# Patient Record
Sex: FEMALE | Race: WHITE | NOT HISPANIC OR LATINO
[De-identification: names, ages, dates, MRNs, and addresses within clinical notes are randomized per-mention and may not be internally consistent; named-entity substitution may affect disease eponyms.]

---

## 2023-02-20 ENCOUNTER — RX ONLY (OUTPATIENT)
Age: 58
Setting detail: RX ONLY
End: 2023-02-20

## 2023-02-20 ENCOUNTER — APPOINTMENT (RX ONLY)
Dept: URBAN - METROPOLITAN AREA CLINIC 146 | Facility: CLINIC | Age: 58
Setting detail: DERMATOLOGY
End: 2023-02-20

## 2023-02-20 DIAGNOSIS — Z41.9 ENCOUNTER FOR PROCEDURE FOR PURPOSES OTHER THAN REMEDYING HEALTH STATE, UNSPECIFIED: ICD-10-CM

## 2023-02-20 PROCEDURE — ? BOTOX

## 2023-02-20 RX ORDER — BIMATOPROST 0.3 MG/ML
SOLUTION/ DROPS OPHTHALMIC
Qty: 1 | Refills: 6 | COMMUNITY
Start: 2023-02-20

## 2023-02-20 ASSESSMENT — LOCATION ZONE DERM: LOCATION ZONE: FACE

## 2023-02-20 ASSESSMENT — LOCATION DETAILED DESCRIPTION DERM: LOCATION DETAILED: LEFT INFERIOR MEDIAL MALAR CHEEK

## 2023-02-20 ASSESSMENT — LOCATION SIMPLE DESCRIPTION DERM: LOCATION SIMPLE: LEFT CHEEK

## 2023-02-20 NOTE — PROCEDURE: BOTOX
Total Units: [address] 44
Map Statement: Please see attached map for locations and injection amounts. \\nGl 8,6,7\\nFh 9,9\\nCF 12,12
Consent: Written consent obtained. Risks include but not limited to lid/brow ptosis, bruising, swelling, diplopia, temporary effect, incomplete chemical denervation.
Show Inventory Tab: Show
Post-Care Instructions: Patient instructed to not lie down for 4 hours and limit physical activity for 24 hours.
Detail Level: Zone

## 2023-03-09 ENCOUNTER — APPOINTMENT (RX ONLY)
Dept: URBAN - METROPOLITAN AREA CLINIC 146 | Facility: CLINIC | Age: 58
Setting detail: DERMATOLOGY
End: 2023-03-09

## 2023-03-09 DIAGNOSIS — Z41.9 ENCOUNTER FOR PROCEDURE FOR PURPOSES OTHER THAN REMEDYING HEALTH STATE, UNSPECIFIED: ICD-10-CM

## 2023-03-09 PROCEDURE — ? MICRONEEDLING

## 2023-03-09 ASSESSMENT — LOCATION DETAILED DESCRIPTION DERM: LOCATION DETAILED: LEFT CENTRAL MALAR CHEEK

## 2023-03-09 ASSESSMENT — LOCATION ZONE DERM: LOCATION ZONE: FACE

## 2023-03-09 ASSESSMENT — LOCATION SIMPLE DESCRIPTION DERM: LOCATION SIMPLE: LEFT CHEEK

## 2023-03-09 NOTE — PROCEDURE: MICRONEEDLING
Depth In Mm: 0.1
Topical Anesthesia?: 10% benzocaine, 3% lidocaine, 2% tetracaine, 0.01% phenylephrine
Detail Level: Zone
Treatment Number (Optional): 0
Consent: Written consent obtained, risks reviewed including but not limited to pain, scarring, infection and incomplete improvement. Patient understands the procedure is cosmetic in nature and will require out of pocket payment.
Price (Use Numbers Only, No Special Characters Or $): [address]
Post-Care Instructions: After the procedure, take precautions agains sun exposure. Do not apply sunscreen for 12 hours after the procedure. Do not apply make-up for 12 hours after the procedure. Avoid alcohol based toners for 10-14 days. After 2-3 days patients can return to their regular skin regimen.
Infusions (Optional): hyaluronic acid

## 2023-05-12 ENCOUNTER — APPOINTMENT (RX ONLY)
Dept: URBAN - METROPOLITAN AREA CLINIC 146 | Facility: CLINIC | Age: 58
Setting detail: DERMATOLOGY
End: 2023-05-12

## 2023-05-12 DIAGNOSIS — Z41.9 ENCOUNTER FOR PROCEDURE FOR PURPOSES OTHER THAN REMEDYING HEALTH STATE, UNSPECIFIED: ICD-10-CM

## 2023-05-12 PROCEDURE — ? COSMETIC CONSULTATION: PROFOUND

## 2023-05-12 PROCEDURE — ? COSMETIC CONSULTATION: GENERAL

## 2023-05-15 ENCOUNTER — APPOINTMENT (RX ONLY)
Dept: URBAN - METROPOLITAN AREA CLINIC 146 | Facility: CLINIC | Age: 58
Setting detail: DERMATOLOGY
End: 2023-05-15

## 2023-05-15 DIAGNOSIS — Z41.9 ENCOUNTER FOR PROCEDURE FOR PURPOSES OTHER THAN REMEDYING HEALTH STATE, UNSPECIFIED: ICD-10-CM

## 2023-05-15 PROCEDURE — ? COSMETIC CONSULTATION: PROFOUND

## 2023-05-19 ENCOUNTER — APPOINTMENT (RX ONLY)
Dept: URBAN - METROPOLITAN AREA CLINIC 146 | Facility: CLINIC | Age: 58
Setting detail: DERMATOLOGY
End: 2023-05-19

## 2023-05-19 DIAGNOSIS — Z41.9 ENCOUNTER FOR PROCEDURE FOR PURPOSES OTHER THAN REMEDYING HEALTH STATE, UNSPECIFIED: ICD-10-CM

## 2023-05-19 PROCEDURE — ? BOTOX

## 2023-05-19 NOTE — PROCEDURE: BOTOX
Show Inventory Tab: Show
Post-Care Instructions: Patient instructed to not lie down for 4 hours and limit physical activity for 24 hours.
Consent: Written consent obtained. Risks include but not limited to lid/brow ptosis, bruising, swelling, diplopia, temporary effect, incomplete chemical denervation.
Detail Level: Detailed
Map Statement: Please see attached map for locations and injection amounts. \\nGl right 8,6,7\\nFh 9,9\\nCF 12,12\\nTotal Units Used - 63\\nSeminar price $12 per unit
Total Units: [address]ol[address] 44

## 2023-06-21 ENCOUNTER — RX ONLY (OUTPATIENT)
Age: 58
Setting detail: RX ONLY
End: 2023-06-21

## 2023-06-21 RX ORDER — HYDROCODONE BITARTRATE AND ACETAMINOPHEN 5; 325 MG/1; MG/1
TABLET ORAL
Qty: 13 | Refills: 0 | Status: ERX | COMMUNITY
Start: 2023-06-21

## 2023-06-21 RX ORDER — METHYLPREDNISOLONE 4 MG/1
TABLET ORAL
Qty: 1 | Refills: 0 | Status: ERX | COMMUNITY
Start: 2023-06-21

## 2023-06-21 RX ORDER — ALPRAZOLAM 1 MG/1
TABLET ORAL
Qty: 4 | Refills: 0 | Status: ERX | COMMUNITY
Start: 2023-06-21

## 2023-06-27 ENCOUNTER — APPOINTMENT (RX ONLY)
Dept: URBAN - METROPOLITAN AREA CLINIC 146 | Facility: CLINIC | Age: 58
Setting detail: DERMATOLOGY
End: 2023-06-27

## 2023-06-27 VITALS — WEIGHT: 160 LBS

## 2023-06-27 DIAGNOSIS — Z41.9 ENCOUNTER FOR PROCEDURE FOR PURPOSES OTHER THAN REMEDYING HEALTH STATE, UNSPECIFIED: ICD-10-CM

## 2023-06-27 PROCEDURE — ? INVENTORY

## 2023-06-27 PROCEDURE — ? ADDITIONAL NOTES

## 2023-06-27 PROCEDURE — ? PROFOUND

## 2023-06-27 NOTE — PROCEDURE: PROFOUND
Anesthesia Volume In Cc: 12
Pre-Procedure Text: After consent was obtained the treatment areas were cleaned and treated using the parameters mentioned above. \\nThe patient was given Xanax 1mg one po 1 hour prior to the procedure as well as Vicodin 5/325 one po 1 hour prior to the procedure. \\nConfirmed the patient does have a  for post procedure and will be notified when the patient is ready for .
Spacing (Right Face): 2mm
Pre-Treatment Photos?: Yes
Price (Use Numbers Only, No Special Characters Or $): 5089  [facility]
Post-Care Instructions: I reviewed with the patient in detail post-care instructions. Patient should stay away from the sun and wear sun protection until fully healed. \\nDiscussed with the patient bruising and swelling is normal. Any areas that are significantly bruising or turning very dark in color should be reported immediately. \\nIf patient is concerned with post procedural bruising they may schedule a laser treatment to speed up the recovery time for an additional fee.
Post-Procedure Text: Following the procedure, post care was reviewed with the patient. \\nPatient may take Xanax 1mg one po qhs up to three nights post procedure as needed to sleep.
Local Anesthesia: 1% lidocaine with 1:100,000 epinephrine and 0.9% sodium chloride (tumescent anesthesia)
Treatment Number: 1
Length Topical Anesthesia Applied (Optional): 45 minutes
Topical Anesthesia?: 12% lidocaine, 12% tetracaine
Consent: Written consent obtained, risks reviewed including but not limited to crusting, scabbing, blistering, scarring, darker or lighter pigmentary change, and/or incomplete improvement. \\nPatient aware additional treatments may be needed depending on individual response at an additional charge to the patient.
Detail Level: Zone

## 2023-12-19 ENCOUNTER — APPOINTMENT (RX ONLY)
Dept: URBAN - METROPOLITAN AREA CLINIC 146 | Facility: CLINIC | Age: 58
Setting detail: DERMATOLOGY
End: 2023-12-19

## 2023-12-19 DIAGNOSIS — Z41.9 ENCOUNTER FOR PROCEDURE FOR PURPOSES OTHER THAN REMEDYING HEALTH STATE, UNSPECIFIED: ICD-10-CM

## 2023-12-19 PROCEDURE — ? BOTOX

## 2023-12-19 NOTE — PROCEDURE: BOTOX
Show Inventory Tab: Show
Post-Care Instructions: Patient instructed to not lie down for 4 hours and limit physical activity for 24 hours.
Consent: Written consent obtained. Risks include but not limited to lid/brow ptosis, bruising, swelling, diplopia, temporary effect, incomplete chemical denervation.
Detail Level: Detailed
Map Statement: Please see attached map for locations and injection amounts.\\nGl right 8,6,7\\nFh 9,9\\nCF 12,12\\nTotal Units Used - 63\\nprice $12 per unit
Total Units: 63

## 2024-10-04 ENCOUNTER — APPOINTMENT (RX ONLY)
Dept: URBAN - METROPOLITAN AREA CLINIC 146 | Facility: CLINIC | Age: 59
Setting detail: DERMATOLOGY
End: 2024-10-04

## 2024-10-04 DIAGNOSIS — Z41.9 ENCOUNTER FOR PROCEDURE FOR PURPOSES OTHER THAN REMEDYING HEALTH STATE, UNSPECIFIED: ICD-10-CM

## 2024-10-04 PROCEDURE — ? BOTOX

## 2024-10-04 PROCEDURE — ? ADDITIONAL NOTES

## 2024-10-04 NOTE — PROCEDURE: ADDITIONAL NOTES
Additional Notes: Briefly discussed the software. Brochure given. She will call Consuelo if she has any questions.
Detail Level: Simple
Render Risk Assessment In Note?: no

## 2024-10-04 NOTE — PROCEDURE: BOTOX
Total Units: 0
Show Inventory Tab: Show
Post-Care Instructions: Patient instructed to not lie down for 4 hours and limit physical activity for 24 hours.
Consent: Written consent obtained. Risks include but not limited to lid/brow ptosis, bruising, swelling, diplopia, temporary effect, incomplete chemical denervation.
Detail Level: Detailed
Map Statement: GL 8,6,7\\nFH 9,9\\nCF 12, 12

## 2025-03-24 ENCOUNTER — RX ONLY (RX ONLY)
Age: 60
End: 2025-03-24

## 2025-03-24 ENCOUNTER — APPOINTMENT (OUTPATIENT)
Dept: URBAN - METROPOLITAN AREA CLINIC 146 | Facility: CLINIC | Age: 60
Setting detail: DERMATOLOGY
End: 2025-03-24

## 2025-03-24 DIAGNOSIS — Z41.9 ENCOUNTER FOR PROCEDURE FOR PURPOSES OTHER THAN REMEDYING HEALTH STATE, UNSPECIFIED: ICD-10-CM

## 2025-03-24 PROCEDURE — ? BOTOX

## 2025-03-24 RX ORDER — BIMATOPROST 0.3 MG/ML
SOLUTION/ DROPS OPHTHALMIC
Qty: 1 | Refills: 6

## 2025-03-24 NOTE — PROCEDURE: BOTOX
Total Units: 0
Detail Level: Zone
Map Statement: Please see attached map for locations and injection amounts.\\n\\nBotox Injection Sites:\\n GL 8,6,7\\nFH 9,9\\nCF 12, 12  add 2 units 2/2 next visit \\nDao 20,20 added today \\nTotal 103 10 free units march promo
Show Inventory Tab: Show
Post-Care Instructions: Patient instructed to not lie down for 4 hours and limit physical activity for 24 hours.
Consent: Written consent obtained. Risks include but not limited to lid/brow ptosis, bruising, swelling, diplopia, temporary effect, incomplete chemical denervation.

## 2025-05-12 ENCOUNTER — APPOINTMENT (OUTPATIENT)
Dept: URBAN - METROPOLITAN AREA CLINIC 146 | Facility: CLINIC | Age: 60
Setting detail: DERMATOLOGY
End: 2025-05-12

## 2025-05-12 DIAGNOSIS — Z41.9 ENCOUNTER FOR PROCEDURE FOR PURPOSES OTHER THAN REMEDYING HEALTH STATE, UNSPECIFIED: ICD-10-CM

## 2025-05-12 PROCEDURE — ? COSMETIC CONSULTATION: SOFWAVE

## 2025-05-12 PROCEDURE — ? COSMETIC CONSULTATION: DAXXIFY
